# Patient Record
Sex: FEMALE | Race: ASIAN | Employment: UNEMPLOYED | URBAN - METROPOLITAN AREA
[De-identification: names, ages, dates, MRNs, and addresses within clinical notes are randomized per-mention and may not be internally consistent; named-entity substitution may affect disease eponyms.]

---

## 2019-04-19 ENCOUNTER — TRANSFERRED RECORDS (OUTPATIENT)
Dept: HEALTH INFORMATION MANAGEMENT | Facility: CLINIC | Age: 44
End: 2019-04-19

## 2019-12-13 ENCOUNTER — TRANSFERRED RECORDS (OUTPATIENT)
Dept: HEALTH INFORMATION MANAGEMENT | Facility: CLINIC | Age: 44
End: 2019-12-13

## 2019-12-19 ENCOUNTER — TRANSFERRED RECORDS (OUTPATIENT)
Dept: HEALTH INFORMATION MANAGEMENT | Facility: CLINIC | Age: 44
End: 2019-12-19

## 2020-08-06 ENCOUNTER — OFFICE VISIT (OUTPATIENT)
Dept: ORTHOPEDICS | Facility: CLINIC | Age: 45
End: 2020-08-06
Payer: MEDICARE

## 2020-08-06 ENCOUNTER — APPOINTMENT (OUTPATIENT)
Dept: LAB | Facility: CLINIC | Age: 45
End: 2020-08-06
Payer: MEDICARE

## 2020-08-06 VITALS
BODY MASS INDEX: 20.89 KG/M2 | DIASTOLIC BLOOD PRESSURE: 72 MMHG | WEIGHT: 130 LBS | SYSTOLIC BLOOD PRESSURE: 122 MMHG | HEIGHT: 66 IN

## 2020-08-06 DIAGNOSIS — S83.8X1A INJURY OF MENISCUS OF RIGHT KNEE, INITIAL ENCOUNTER: ICD-10-CM

## 2020-08-06 DIAGNOSIS — M25.461 KNEE EFFUSION, RIGHT: Primary | ICD-10-CM

## 2020-08-06 DIAGNOSIS — M94.261 CHONDROMALACIA OF MEDIAL FEMORAL CONDYLE, RIGHT: ICD-10-CM

## 2020-08-06 PROCEDURE — 99203 OFFICE O/P NEW LOW 30 MIN: CPT | Mod: 25 | Performed by: FAMILY MEDICINE

## 2020-08-06 PROCEDURE — 20610 DRAIN/INJ JOINT/BURSA W/O US: CPT | Mod: RT | Performed by: FAMILY MEDICINE

## 2020-08-06 PROCEDURE — 86618 LYME DISEASE ANTIBODY: CPT | Performed by: FAMILY MEDICINE

## 2020-08-06 PROCEDURE — 36415 COLL VENOUS BLD VENIPUNCTURE: CPT | Performed by: FAMILY MEDICINE

## 2020-08-06 RX ORDER — FAMOTIDINE 40 MG/1
40 TABLET, FILM COATED ORAL
COMMUNITY
Start: 2020-07-27

## 2020-08-06 RX ORDER — TRIAMCINOLONE ACETONIDE 40 MG/ML
40 INJECTION, SUSPENSION INTRA-ARTICULAR; INTRAMUSCULAR
Status: SHIPPED | OUTPATIENT
Start: 2020-08-06

## 2020-08-06 RX ORDER — FLUTICASONE PROPIONATE 50 MCG
SPRAY, SUSPENSION (ML) NASAL
COMMUNITY
Start: 2019-09-25

## 2020-08-06 RX ORDER — TRIAMCINOLONE ACETONIDE 1 MG/G
OINTMENT TOPICAL
COMMUNITY
Start: 2020-06-29

## 2020-08-06 RX ORDER — ROPIVACAINE HYDROCHLORIDE 5 MG/ML
3 INJECTION, SOLUTION EPIDURAL; INFILTRATION; PERINEURAL
Status: SHIPPED | OUTPATIENT
Start: 2020-08-06

## 2020-08-06 RX ORDER — GABAPENTIN 300 MG/1
CAPSULE ORAL
COMMUNITY
Start: 2020-05-27

## 2020-08-06 RX ORDER — OMEPRAZOLE 10 MG/1
10 CAPSULE, DELAYED RELEASE ORAL
COMMUNITY
Start: 2019-04-19

## 2020-08-06 RX ADMIN — TRIAMCINOLONE ACETONIDE 40 MG: 40 INJECTION, SUSPENSION INTRA-ARTICULAR; INTRAMUSCULAR at 11:44

## 2020-08-06 RX ADMIN — ROPIVACAINE HYDROCHLORIDE 3 ML: 5 INJECTION, SOLUTION EPIDURAL; INFILTRATION; PERINEURAL at 11:44

## 2020-08-06 ASSESSMENT — MIFFLIN-ST. JEOR: SCORE: 1246.43

## 2020-08-06 NOTE — LETTER
8/6/2020         RE: Lucille Schneider  [id number] [address] [facility] [address]        Dear Colleague,    Thank you for referring your patient, Lucille Schneider, to the  SPORTS MEDICINE. Please see a copy of my visit note below.    HPI   [facility]   Clinic Visit s Aug 6, 2020    PCP: No primary care provider on file.    ASSESSMENT/PLAN    ICD-10-CM    1. Knee effusion, right  M25.461 Erythrocyte sedimentation rate auto     CRP inflammation     Lyme Disease Allison with reflex to WB Serum     Uric acid     Large Joint Injection/Arthocentesis: R knee joint   2. Injury of meniscus of right knee, initial encounter  S83.8X1A Large Joint Injection/Arthocentesis: R knee joint   3. Chondromalacia of medial femoral condyle, right  M94.261 Large Joint Injection/Arthocentesis: R knee joint       Prominent knee effusion which is not entirely explained by the small patch of chondromalacia in the medial femoral condyle.  Bilateral meniscus extrusions raise concern for occult meniscus tear.  She is requesting a repeat cortisone injection to see if this works, but I recommended in addition that we proceed with a serologic screen evaluation for inflammatory diseases.  She was comfortable with this approach, and when the results are completed from the lab they will be mailed to her at her request.          Today's Visit:  Lucille is a 44 year old female who is seen as self referral for   Chief Complaint   Patient presents with     Right Knee - Pain     Accompanied by a friend who translates for the patient  Injury: Reports insidious onset without acute precipitating event.       Location of Pain: right knee anterior , nonradiating   Duration of Pain: chronic, worsening, 1 year(s),   Rating of Pain at worst: 8/10  Rating of Pain Currently: 8/10  Pain is better with: compression and icyhot  Pain is worse with: standing and walking and flexion  Treatment so far consists of: other medications: amitriptyline  Associated symptoms: swelling  "Mild  Recent imaging completed: X-rays completed .  Prior History of related problems: prior treatment at [facility] with XR, PHYSICAL THERAPY, cortisone steroid injection, which didn't help much.  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     Social History: is unemployed     PMH, PSHX, FMHX, SOCHX all reviewed and are unremarkable or noncontributory to today's visit.  See intake form for details.        Review of Systems   Musculoskeletal: Positive for joint pain.   All other systems reviewed and are negative.        Physical Exam  Musculoskeletal:      Right knee: She exhibits effusion.       /72   Ht 1.676 m (5' 6\")   Wt 59 kg (130 lb)   BMI 20.98 kg/m    Constitutional:well-developed, well-nourished, and in no distress.   Cardiovascular: Intact distal pulses.    Neurological: alert. Gait Abnormal:   Antalgic gait  Skin: Skin is warm and dry.   Psychiatric: Mood and affect normal.   Respiratory: unlabored, speaks in full sentences  Lymph: no LAD, no lymphangitis      Right Knee Exam     Tenderness   The patient is experiencing tenderness in the medial joint line, lateral joint line, medial retinaculum and lateral retinaculum.    Range of Motion   Extension: normal   Flexion:  120 abnormal     Tests   Dania:  Medial - positive Lateral - negative  Varus: negative Valgus: negative  Lachman:  Anterior - negative      Drawer:  Anterior - negative    Posterior - negative  Patellar apprehension: negative    Other   Erythema: absent  Scars: absent  Sensation: normal  Pulse: present  Swelling: moderate  Effusion: effusion present                 Sudarshan Wood - 12/13/2019 12:09 PM CST    INDICATION:  Acute pain right knee.    COMPARISON:  None.    TECHNIQUE:  Single AP upright both knees.    FINDINGS:  No evidence of acute fracture or dislocation. No evidence of instability.    Impression :  Negative single upright AP view both " knees.      Dictated by Elle Do MD @ Dec 13 2019 12:09PM    (Electronically Signed)       MRI knee R  12/2019  HISTORY:  Right knee pain.    TECHNIQUE:  Axial, sagittal and coronal T1, proton density, proton density fat-sat and T2 fat-sat images were obtained of the right knee without contrast administration.    COMPARISON:  No prior.    FINDINGS:  Medial compartment:  Medial meniscus: Mild medial extrusion of the medial meniscal body. No discrete medial meniscal tear.  Articular cartilage: There is subchondral cystic change and marrow edema involving medial femoral condyle centrally which likely indicates that there is focally up to grade 4 full-thickness cartilage abnormality present. This is present with an area of otherwise moderate grade cartilage wear (grade 2/3). Mild cartilage wear of the medial tibial plateau (grade 2).  -  Lateral compartment:  Lateral meniscus: The lateral meniscal body appears laterally extruded. No discrete lateral meniscal tear.  Articular cartilage: Maintained.  -  Patellofemoral compartment: The articular surfaces are smooth without focal articular cartilage defect.  -  Ligaments: The anterior and posterior cruciate ligaments are intact. Medial collateral ligament is intact. Lateral ligamentous complex is maintained.  -  Extensor mechanism: The distal quadriceps tendon and patellar tendon are intact. Medial and lateral patellar restraints are intact. No patellar subluxation or lorena.  -  Joint space: There is fairly prominent synovitis within the joint space. A moderate-sized joint effusion is present.  -  Bones and soft tissues: There are a few small bone islands. No acute fracture. No avascular necrosis. As above, there is subchondral marrow edema and cystic change involving the medial femoral condyle with a chondromalacia. Small amount of fluid is present within the semimembranosus/MCL and pes anserine bursa tracking inferiorly. There is loculated fluid along the  popliteus musculotendinous junction. The popliteus tendon is intact.    IMPRESSION:  1. Prominent synovitis within the knee joint space with a moderate effusion. Does the patient have a history of an inflammatory arthropathy such as rheumatoid arthritis, gout or chronic infection? Consider correlation with results of arthrocentesis.  2. Chondromalacia of the medial femoral condyle, likely focally up to grade 4 given the subchondral cystic change and marrow edema.  3. Mild peripheral extrusion of the medial and lateral meniscal bodies without discrete meniscal tear.  4. Semimembranosus/MCL and pes anserine bursal fluid suggesting bursitis.  5. Some loculated fluid within the popliteus muscle along the musculotendinous junction.          Large Joint Injection/Arthocentesis: R knee joint    Date/Time: 8/6/2020 11:44 AM  Performed by: Kevin Murray MD  Authorized by: Kevin Murray MD     Indications:  Pain  Needle Size:  25 G  Guidance: landmark guided    Approach:  Anterolateral  Location:  Knee      Medications:  40 mg triamcinolone 40 MG/ML; 3 mL ropivacaine 5 MG/ML  Medications comment:  5 ml of Ropivacaine were used  Outcome:  Tolerated well, no immediate complications  Procedure discussed: discussed risks, benefits, and alternatives    Consent Given by:  Patient  Timeout: timeout called immediately prior to procedure    Prep: patient was prepped and draped in usual sterile fashion            Again, thank you for allowing me to participate in the care of your patient.        Sincerely,        Kevin Murray MD

## 2020-08-06 NOTE — PROGRESS NOTES
[facility]   Clinic Visit s Aug 6, 2020    PCP: No primary care provider on file.    ASSESSMENT/PLAN    ICD-10-CM    1. Knee effusion, right  M25.461 Erythrocyte sedimentation rate auto     CRP inflammation     Lyme Disease Allison with reflex to WB Serum     Uric acid     Large Joint Injection/Arthocentesis: R knee joint   2. Injury of meniscus of right knee, initial encounter  S83.8X1A Large Joint Injection/Arthocentesis: R knee joint   3. Chondromalacia of medial femoral condyle, right  M94.261 Large Joint Injection/Arthocentesis: R knee joint       Prominent knee effusion which is not entirely explained by the small patch of chondromalacia in the medial femoral condyle.  Bilateral meniscus extrusions raise concern for occult meniscus tear.  She is requesting a repeat cortisone injection to see if this works, but I recommended in addition that we proceed with a serologic screen evaluation for inflammatory diseases.  She was comfortable with this approach, and when the results are completed from the lab they will be mailed to her at her request.          Today's Visit:  Lucille is a 44 year old female who is seen as self referral for   Chief Complaint   Patient presents with     Right Knee - Pain     Accompanied by a friend who translates for the patient  Injury: Reports insidious onset without acute precipitating event.       Location of Pain: right knee anterior , nonradiating   Duration of Pain: chronic, worsening, 1 year(s),   Rating of Pain at worst: 8/10  Rating of Pain Currently: 8/10  Pain is better with: compression and icyhot  Pain is worse with: standing and walking and flexion  Treatment so far consists of: other medications: amitriptyline  Associated symptoms: swelling Mild  Recent imaging completed: X-rays completed .  Prior History of related problems: prior treatment at [facility] with XR, PHYSICAL THERAPY, cortisone steroid injection, which didn't help much.  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     Social History: is unemployed     PMH, PSHX, FMHX, SOCHX all reviewed and are unremarkable or noncontributory to today's visit.  See intake form for details.        Review of Systems   Musculoskeletal: Positive for joint pain.   All other systems reviewed and are negative.        Physical Exam  Musculoskeletal:      Right knee: She exhibits effusion.       /72   Ht 1.676 m (5' 6\")   Wt 59 kg (130 lb)   BMI 20.98 kg/m    Constitutional:well-developed, well-nourished, and in no distress.   Cardiovascular: Intact distal pulses.    Neurological: alert. Gait Abnormal:   Antalgic gait  Skin: Skin is warm and dry.   Psychiatric: Mood and affect normal.   Respiratory: unlabored, speaks in full sentences  Lymph: no LAD, no lymphangitis      Right Knee Exam     Tenderness   The patient is experiencing tenderness in the medial joint line, lateral joint line, medial retinaculum and lateral retinaculum.    Range of Motion   Extension: normal   Flexion:  120 abnormal     Tests   Dania:  Medial - positive Lateral - negative  Varus: negative Valgus: negative  Lachman:  Anterior - negative      Drawer:  Anterior - negative    Posterior - negative  Patellar apprehension: negative    Other   Erythema: absent  Scars: absent  Sensation: normal  Pulse: present  Swelling: moderate  Effusion: effusion present                 Interface, Powerscribe - 12/13/2019 12:09 PM CST    INDICATION:  Acute pain right knee.    COMPARISON:  None.    TECHNIQUE:  Single AP upright both knees.    FINDINGS:  No evidence of acute fracture or dislocation. No evidence of instability.    Impression :  Negative single upright AP view both knees.      Dictated by Elle Do MD @ Dec 13 2019 12:09PM    (Electronically Signed)       MRI knee R  12/2019  HISTORY:  Right knee pain.    TECHNIQUE:  Axial, sagittal and coronal T1, proton density, proton " density fat-sat and T2 fat-sat images were obtained of the right knee without contrast administration.    COMPARISON:  No prior.    FINDINGS:  Medial compartment:  Medial meniscus: Mild medial extrusion of the medial meniscal body. No discrete medial meniscal tear.  Articular cartilage: There is subchondral cystic change and marrow edema involving medial femoral condyle centrally which likely indicates that there is focally up to grade 4 full-thickness cartilage abnormality present. This is present with an area of otherwise moderate grade cartilage wear (grade 2/3). Mild cartilage wear of the medial tibial plateau (grade 2).  -  Lateral compartment:  Lateral meniscus: The lateral meniscal body appears laterally extruded. No discrete lateral meniscal tear.  Articular cartilage: Maintained.  -  Patellofemoral compartment: The articular surfaces are smooth without focal articular cartilage defect.  -  Ligaments: The anterior and posterior cruciate ligaments are intact. Medial collateral ligament is intact. Lateral ligamentous complex is maintained.  -  Extensor mechanism: The distal quadriceps tendon and patellar tendon are intact. Medial and lateral patellar restraints are intact. No patellar subluxation or lorena.  -  Joint space: There is fairly prominent synovitis within the joint space. A moderate-sized joint effusion is present.  -  Bones and soft tissues: There are a few small bone islands. No acute fracture. No avascular necrosis. As above, there is subchondral marrow edema and cystic change involving the medial femoral condyle with a chondromalacia. Small amount of fluid is present within the semimembranosus/MCL and pes anserine bursa tracking inferiorly. There is loculated fluid along the popliteus musculotendinous junction. The popliteus tendon is intact.    IMPRESSION:  1. Prominent synovitis within the knee joint space with a moderate effusion. Does the patient have a history of an inflammatory arthropathy  such as rheumatoid arthritis, gout or chronic infection? Consider correlation with results of arthrocentesis.  2. Chondromalacia of the medial femoral condyle, likely focally up to grade 4 given the subchondral cystic change and marrow edema.  3. Mild peripheral extrusion of the medial and lateral meniscal bodies without discrete meniscal tear.  4. Semimembranosus/MCL and pes anserine bursal fluid suggesting bursitis.  5. Some loculated fluid within the popliteus muscle along the musculotendinous junction.          Large Joint Injection/Arthocentesis: R knee joint    Date/Time: 8/6/2020 11:44 AM  Performed by: Kevin Murray MD  Authorized by: Kevin Murray MD     Indications:  Pain  Needle Size:  25 G  Guidance: landmark guided    Approach:  Anterolateral  Location:  Knee      Medications:  40 mg triamcinolone 40 MG/ML; 3 mL ropivacaine 5 MG/ML  Medications comment:  5 ml of Ropivacaine were used  Outcome:  Tolerated well, no immediate complications  Procedure discussed: discussed risks, benefits, and alternatives    Consent Given by:  Patient  Timeout: timeout called immediately prior to procedure    Prep: patient was prepped and draped in usual sterile fashion

## 2020-08-07 LAB — B BURGDOR IGG+IGM SER QL: 0.16 (ref 0–0.89)
